# Patient Record
Sex: FEMALE | Race: BLACK OR AFRICAN AMERICAN | NOT HISPANIC OR LATINO | URBAN - METROPOLITAN AREA
[De-identification: names, ages, dates, MRNs, and addresses within clinical notes are randomized per-mention and may not be internally consistent; named-entity substitution may affect disease eponyms.]

---

## 2017-01-31 ENCOUNTER — OUTPATIENT (OUTPATIENT)
Dept: OUTPATIENT SERVICES | Age: 32
LOS: 1 days | Discharge: HOME | End: 2017-01-31

## 2017-02-01 ENCOUNTER — APPOINTMENT (OUTPATIENT)
Dept: PEDIATRIC CARDIOLOGY | Facility: CLINIC | Age: 32
End: 2017-02-01

## 2017-03-03 ENCOUNTER — OUTPATIENT (OUTPATIENT)
Dept: OUTPATIENT SERVICES | Facility: HOSPITAL | Age: 32
LOS: 1 days | End: 2017-03-03
Payer: COMMERCIAL

## 2017-03-03 DIAGNOSIS — O26.899 OTHER SPECIFIED PREGNANCY RELATED CONDITIONS, UNSPECIFIED TRIMESTER: ICD-10-CM

## 2017-03-03 DIAGNOSIS — Z3A.00 WEEKS OF GESTATION OF PREGNANCY NOT SPECIFIED: ICD-10-CM

## 2017-03-03 LAB
APPEARANCE UR: CLEAR — SIGNIFICANT CHANGE UP
BACTERIA # UR AUTO: SIGNIFICANT CHANGE UP
BILIRUB UR-MCNC: NEGATIVE — SIGNIFICANT CHANGE UP
BLOOD UR QL VISUAL: NEGATIVE — SIGNIFICANT CHANGE UP
COLOR SPEC: YELLOW — SIGNIFICANT CHANGE UP
GLUCOSE UR-MCNC: NEGATIVE — SIGNIFICANT CHANGE UP
KETONES UR-MCNC: NEGATIVE — SIGNIFICANT CHANGE UP
LEUKOCYTE ESTERASE UR-ACNC: NEGATIVE — SIGNIFICANT CHANGE UP
MUCOUS THREADS # UR AUTO: SIGNIFICANT CHANGE UP
NITRITE UR-MCNC: NEGATIVE — SIGNIFICANT CHANGE UP
NON-SQ EPI CELLS # UR AUTO: <1 — SIGNIFICANT CHANGE UP
PH UR: 6 — SIGNIFICANT CHANGE UP (ref 4.6–8)
PROT UR-MCNC: NEGATIVE — SIGNIFICANT CHANGE UP
RBC CASTS # UR COMP ASSIST: SIGNIFICANT CHANGE UP (ref 0–?)
SP GR SPEC: 1.01 — SIGNIFICANT CHANGE UP (ref 1–1.03)
SQUAMOUS # UR AUTO: SIGNIFICANT CHANGE UP
UROBILINOGEN FLD QL: NORMAL E.U. — SIGNIFICANT CHANGE UP (ref 0.1–0.2)
WBC UR QL: SIGNIFICANT CHANGE UP (ref 0–?)

## 2017-03-03 PROCEDURE — 93971 EXTREMITY STUDY: CPT | Mod: 26,LT

## 2017-03-21 ENCOUNTER — OUTPATIENT (OUTPATIENT)
Dept: OUTPATIENT SERVICES | Facility: HOSPITAL | Age: 32
LOS: 1 days | End: 2017-03-21
Payer: COMMERCIAL

## 2017-03-21 DIAGNOSIS — Z3A.00 WEEKS OF GESTATION OF PREGNANCY NOT SPECIFIED: ICD-10-CM

## 2017-03-21 DIAGNOSIS — O26.899 OTHER SPECIFIED PREGNANCY RELATED CONDITIONS, UNSPECIFIED TRIMESTER: ICD-10-CM

## 2017-03-21 PROCEDURE — 99215 OFFICE O/P EST HI 40 MIN: CPT | Mod: 25

## 2017-03-21 PROCEDURE — 76818 FETAL BIOPHYS PROFILE W/NST: CPT | Mod: 26

## 2017-03-21 RX ORDER — SODIUM CHLORIDE 9 MG/ML
500 INJECTION, SOLUTION INTRAVENOUS ONCE
Qty: 0 | Refills: 0 | Status: COMPLETED | OUTPATIENT
Start: 2017-03-21 | End: 2017-03-21

## 2017-03-21 RX ORDER — SODIUM CHLORIDE 9 MG/ML
1000 INJECTION, SOLUTION INTRAVENOUS
Qty: 0 | Refills: 0 | Status: DISCONTINUED | OUTPATIENT
Start: 2017-03-21 | End: 2017-04-05

## 2017-03-21 RX ADMIN — SODIUM CHLORIDE 1500 MILLILITER(S): 9 INJECTION, SOLUTION INTRAVENOUS at 16:40

## 2017-03-21 RX ADMIN — SODIUM CHLORIDE 125 MILLILITER(S): 9 INJECTION, SOLUTION INTRAVENOUS at 17:08

## 2017-05-19 ENCOUNTER — OUTPATIENT (OUTPATIENT)
Dept: OUTPATIENT SERVICES | Facility: HOSPITAL | Age: 32
LOS: 1 days | End: 2017-05-19
Payer: COMMERCIAL

## 2017-05-19 VITALS
TEMPERATURE: 98 F | HEIGHT: 62 IN | OXYGEN SATURATION: 99 % | RESPIRATION RATE: 16 BRPM | WEIGHT: 210.1 LBS | HEART RATE: 122 BPM | SYSTOLIC BLOOD PRESSURE: 108 MMHG | DIASTOLIC BLOOD PRESSURE: 76 MMHG

## 2017-05-19 DIAGNOSIS — Z33.1 PREGNANT STATE, INCIDENTAL: ICD-10-CM

## 2017-05-19 DIAGNOSIS — Z91.013 ALLERGY TO SEAFOOD: ICD-10-CM

## 2017-05-19 DIAGNOSIS — O34.219 MATERNAL CARE FOR UNSPECIFIED TYPE SCAR FROM PREVIOUS CESAREAN DELIVERY: ICD-10-CM

## 2017-05-19 LAB
APPEARANCE UR: SIGNIFICANT CHANGE UP
BACTERIA # UR AUTO: SIGNIFICANT CHANGE UP
BILIRUB UR-MCNC: NEGATIVE — SIGNIFICANT CHANGE UP
BLD GP AB SCN SERPL QL: NEGATIVE — SIGNIFICANT CHANGE UP
BLOOD UR QL VISUAL: NEGATIVE — SIGNIFICANT CHANGE UP
BUN SERPL-MCNC: 7 MG/DL — SIGNIFICANT CHANGE UP (ref 7–23)
CALCIUM SERPL-MCNC: 9.1 MG/DL — SIGNIFICANT CHANGE UP (ref 8.4–10.5)
CHLORIDE SERPL-SCNC: 101 MMOL/L — SIGNIFICANT CHANGE UP (ref 98–107)
CO2 SERPL-SCNC: 21 MMOL/L — LOW (ref 22–31)
COLOR SPEC: YELLOW — SIGNIFICANT CHANGE UP
CREAT SERPL-MCNC: 0.69 MG/DL — SIGNIFICANT CHANGE UP (ref 0.5–1.3)
GLUCOSE SERPL-MCNC: 90 MG/DL — SIGNIFICANT CHANGE UP (ref 70–99)
GLUCOSE UR-MCNC: NEGATIVE — SIGNIFICANT CHANGE UP
HBA1C BLD-MCNC: 6.1 % — HIGH (ref 4–5.6)
HCT VFR BLD CALC: 34.1 % — LOW (ref 34.5–45)
HGB BLD-MCNC: 10.6 G/DL — LOW (ref 11.5–15.5)
KETONES UR-MCNC: NEGATIVE — SIGNIFICANT CHANGE UP
LEUKOCYTE ESTERASE UR-ACNC: HIGH
MCHC RBC-ENTMCNC: 24.4 PG — LOW (ref 27–34)
MCHC RBC-ENTMCNC: 31.1 % — LOW (ref 32–36)
MCV RBC AUTO: 78.4 FL — LOW (ref 80–100)
MUCOUS THREADS # UR AUTO: SIGNIFICANT CHANGE UP
NITRITE UR-MCNC: NEGATIVE — SIGNIFICANT CHANGE UP
NON-SQ EPI CELLS # UR AUTO: 1 — SIGNIFICANT CHANGE UP
PH UR: 6.5 — SIGNIFICANT CHANGE UP (ref 4.6–8)
PLATELET # BLD AUTO: 186 K/UL — SIGNIFICANT CHANGE UP (ref 150–400)
PMV BLD: 12.5 FL — SIGNIFICANT CHANGE UP (ref 7–13)
POTASSIUM SERPL-MCNC: 3.9 MMOL/L — SIGNIFICANT CHANGE UP (ref 3.5–5.3)
POTASSIUM SERPL-SCNC: 3.9 MMOL/L — SIGNIFICANT CHANGE UP (ref 3.5–5.3)
PROT UR-MCNC: 10 — SIGNIFICANT CHANGE UP
RBC # BLD: 4.35 M/UL — SIGNIFICANT CHANGE UP (ref 3.8–5.2)
RBC # FLD: 17.2 % — HIGH (ref 10.3–14.5)
RBC CASTS # UR COMP ASSIST: SIGNIFICANT CHANGE UP (ref 0–?)
RH IG SCN BLD-IMP: POSITIVE — SIGNIFICANT CHANGE UP
SODIUM SERPL-SCNC: 138 MMOL/L — SIGNIFICANT CHANGE UP (ref 135–145)
SP GR SPEC: 1.01 — SIGNIFICANT CHANGE UP (ref 1–1.03)
SQUAMOUS # UR AUTO: SIGNIFICANT CHANGE UP
T PALLIDUM AB TITR SER: NEGATIVE — SIGNIFICANT CHANGE UP
UROBILINOGEN FLD QL: NORMAL E.U. — SIGNIFICANT CHANGE UP (ref 0.1–0.2)
WBC # BLD: 7.31 K/UL — SIGNIFICANT CHANGE UP (ref 3.8–10.5)
WBC # FLD AUTO: 7.31 K/UL — SIGNIFICANT CHANGE UP (ref 3.8–10.5)
WBC UR QL: HIGH (ref 0–?)

## 2017-05-19 PROCEDURE — 93010 ELECTROCARDIOGRAM REPORT: CPT

## 2017-05-19 NOTE — H&P PST ADULT - NEGATIVE FEMALE-SPECIFIC SYMPTOMS
no abnormal vaginal bleeding/no spotting/no pelvic pain no abnormal vaginal bleeding/no pelvic pain/denies leakage of amniotic fluid/no spotting

## 2017-05-19 NOTE — H&P PST ADULT - NSANTHOSAYNRD_GEN_A_CORE
No. ELIAS screening performed.  STOP BANG Legend: 0-2 = LOW Risk; 3-4 = INTERMEDIATE Risk; 5-8 = HIGH Risk

## 2017-05-19 NOTE — H&P PST ADULT - PMH
Allergic rhinitis    Anemia    Asthma  denies intubation, has not used inhaler for 2 months  Gestational diabetes mellitus in pregnancy, insulin controlled  pt reports she was diagnosed in her 5th week fo pregnancy, and she may have DM2  Pregnancy

## 2017-05-19 NOTE — H&P PST ADULT - NEGATIVE MUSCULOSKELETAL SYMPTOMS
no arthritis/no joint swelling/no arm pain L/no arm pain R/no stiffness/no myalgia/no muscle cramps/no leg pain L/no neck pain/no muscle weakness/no arthralgia/no back pain

## 2017-05-19 NOTE — H&P PST ADULT - LYMPHATIC
anterior cervical R/posterior cervical R/supraclavicular L/anterior cervical L/supraclavicular R/posterior cervical L

## 2017-05-19 NOTE — H&P PST ADULT - HISTORY OF PRESENT ILLNESS
32 yo female with no pertinent PMH presents to pre surgical testing.  Pt reports she is 37 weeks into gestation, pt is , SAMM 17.  Pt reports she was diagnosed with diabetes in her 5th week of pregnancy.  Pt denies vaginal bleeding, spotting, or leakage of amniotic fluid.  Pt denies regular uterine contractions.  Pt reports positive fetal movement today. pt is scheduled for repeat  section x2 on 17.

## 2017-05-19 NOTE — H&P PST ADULT - RS GEN PE MLT RESP DETAILS PC
airway patent/breath sounds equal/no subcutaneous emphysema/clear to auscultation bilaterally/no intercostal retractions/respirations non-labored/no chest wall tenderness/no rales/no wheezes/no rhonchi/good air movement

## 2017-05-19 NOTE — H&P PST ADULT - FAMILY HISTORY
Father  Still living? Unknown  Family history of diabetes mellitus, Age at diagnosis: Age Unknown  Family history of hypertension, Age at diagnosis: Age Unknown     Mother  Still living? Yes, Estimated age: Age Unknown  Family history of diabetes mellitus, Age at diagnosis: Age Unknown  Family history of hypertension, Age at diagnosis: Age Unknown

## 2017-05-19 NOTE — H&P PST ADULT - PROBLEM SELECTOR PLAN 1
pt is scheduled for repeat  section x2 on 17.  Pre op instructions including chlorhexidine wash given and pt able to verbalize understanding.   Pt instructed to consult OB regarding all prescription medications pre op

## 2017-05-26 ENCOUNTER — INPATIENT (INPATIENT)
Facility: HOSPITAL | Age: 32
LOS: 2 days | Discharge: HOME | End: 2017-05-29
Attending: OBSTETRICS & GYNECOLOGY | Admitting: OBSTETRICS & GYNECOLOGY
Payer: COMMERCIAL

## 2017-05-26 ENCOUNTER — TRANSCRIPTION ENCOUNTER (OUTPATIENT)
Age: 32
End: 2017-05-26

## 2017-05-26 VITALS — HEIGHT: 62 IN | WEIGHT: 209.44 LBS

## 2017-05-26 DIAGNOSIS — Z3A.00 WEEKS OF GESTATION OF PREGNANCY NOT SPECIFIED: ICD-10-CM

## 2017-05-26 DIAGNOSIS — O26.899 OTHER SPECIFIED PREGNANCY RELATED CONDITIONS, UNSPECIFIED TRIMESTER: ICD-10-CM

## 2017-05-26 LAB
BASOPHILS # BLD AUTO: 0.02 K/UL — SIGNIFICANT CHANGE UP (ref 0–0.2)
BASOPHILS NFR BLD AUTO: 0.2 % — SIGNIFICANT CHANGE UP (ref 0–2)
BLD GP AB SCN SERPL QL: NEGATIVE — SIGNIFICANT CHANGE UP
EOSINOPHIL # BLD AUTO: 0.23 K/UL — SIGNIFICANT CHANGE UP (ref 0–0.5)
EOSINOPHIL NFR BLD AUTO: 2.7 % — SIGNIFICANT CHANGE UP (ref 0–6)
HCT VFR BLD CALC: 35.2 % — SIGNIFICANT CHANGE UP (ref 34.5–45)
HGB BLD-MCNC: 11 G/DL — LOW (ref 11.5–15.5)
IMM GRANULOCYTES NFR BLD AUTO: 0.2 % — SIGNIFICANT CHANGE UP (ref 0–1.5)
LYMPHOCYTES # BLD AUTO: 1.59 K/UL — SIGNIFICANT CHANGE UP (ref 1–3.3)
LYMPHOCYTES # BLD AUTO: 18.8 % — SIGNIFICANT CHANGE UP (ref 13–44)
MCHC RBC-ENTMCNC: 24.4 PG — LOW (ref 27–34)
MCHC RBC-ENTMCNC: 31.3 % — LOW (ref 32–36)
MCV RBC AUTO: 78 FL — LOW (ref 80–100)
MONOCYTES # BLD AUTO: 0.52 K/UL — SIGNIFICANT CHANGE UP (ref 0–0.9)
MONOCYTES NFR BLD AUTO: 6.2 % — SIGNIFICANT CHANGE UP (ref 2–14)
NEUTROPHILS # BLD AUTO: 6.07 K/UL — SIGNIFICANT CHANGE UP (ref 1.8–7.4)
NEUTROPHILS NFR BLD AUTO: 71.9 % — SIGNIFICANT CHANGE UP (ref 43–77)
PLATELET # BLD AUTO: 152 K/UL — SIGNIFICANT CHANGE UP (ref 150–400)
PMV BLD: SIGNIFICANT CHANGE UP FL (ref 7–13)
RBC # BLD: 4.51 M/UL — SIGNIFICANT CHANGE UP (ref 3.8–5.2)
RBC # FLD: 17 % — HIGH (ref 10.3–14.5)
RH IG SCN BLD-IMP: POSITIVE — SIGNIFICANT CHANGE UP
WBC # BLD: 8.45 K/UL — SIGNIFICANT CHANGE UP (ref 3.8–10.5)
WBC # FLD AUTO: 8.45 K/UL — SIGNIFICANT CHANGE UP (ref 3.8–10.5)

## 2017-05-26 RX ORDER — HEPARIN SODIUM 5000 [USP'U]/ML
5000 INJECTION INTRAVENOUS; SUBCUTANEOUS EVERY 12 HOURS
Qty: 0 | Refills: 0 | Status: DISCONTINUED | OUTPATIENT
Start: 2017-05-27 | End: 2017-05-29

## 2017-05-26 RX ORDER — SIMETHICONE 80 MG/1
80 TABLET, CHEWABLE ORAL EVERY 4 HOURS
Qty: 0 | Refills: 0 | Status: DISCONTINUED | OUTPATIENT
Start: 2017-05-27 | End: 2017-05-29

## 2017-05-26 RX ORDER — FAMOTIDINE 10 MG/ML
20 INJECTION INTRAVENOUS ONCE
Qty: 0 | Refills: 0 | Status: COMPLETED | OUTPATIENT
Start: 2017-05-26 | End: 2017-05-26

## 2017-05-26 RX ORDER — ACETAMINOPHEN 500 MG
975 TABLET ORAL EVERY 6 HOURS
Qty: 0 | Refills: 0 | Status: DISCONTINUED | OUTPATIENT
Start: 2017-05-27 | End: 2017-05-29

## 2017-05-26 RX ORDER — MORPHINE SULFATE 50 MG/1
0.15 CAPSULE, EXTENDED RELEASE ORAL ONCE
Qty: 0 | Refills: 0 | Status: DISCONTINUED | OUTPATIENT
Start: 2017-05-26 | End: 2017-05-28

## 2017-05-26 RX ORDER — INSULIN LISPRO 100/ML
14 VIAL (ML) SUBCUTANEOUS
Qty: 0 | Refills: 0 | COMMUNITY

## 2017-05-26 RX ORDER — CITRIC ACID/SODIUM CITRATE 300-500 MG
30 SOLUTION, ORAL ORAL ONCE
Qty: 0 | Refills: 0 | Status: COMPLETED | OUTPATIENT
Start: 2017-05-26 | End: 2017-05-26

## 2017-05-26 RX ORDER — ONDANSETRON 8 MG/1
4 TABLET, FILM COATED ORAL EVERY 6 HOURS
Qty: 0 | Refills: 0 | Status: DISCONTINUED | OUTPATIENT
Start: 2017-05-26 | End: 2017-05-28

## 2017-05-26 RX ORDER — GLYCERIN ADULT
1 SUPPOSITORY, RECTAL RECTAL AT BEDTIME
Qty: 0 | Refills: 0 | Status: DISCONTINUED | OUTPATIENT
Start: 2017-05-27 | End: 2017-05-29

## 2017-05-26 RX ORDER — LANOLIN
1 OINTMENT (GRAM) TOPICAL
Qty: 0 | Refills: 0 | Status: DISCONTINUED | OUTPATIENT
Start: 2017-05-27 | End: 2017-05-29

## 2017-05-26 RX ORDER — TETANUS TOXOID, REDUCED DIPHTHERIA TOXOID AND ACELLULAR PERTUSSIS VACCINE, ADSORBED 5; 2.5; 8; 8; 2.5 [IU]/.5ML; [IU]/.5ML; UG/.5ML; UG/.5ML; UG/.5ML
0.5 SUSPENSION INTRAMUSCULAR ONCE
Qty: 0 | Refills: 0 | Status: DISCONTINUED | OUTPATIENT
Start: 2017-05-27 | End: 2017-05-29

## 2017-05-26 RX ORDER — METOCLOPRAMIDE HCL 10 MG
10 TABLET ORAL ONCE
Qty: 0 | Refills: 0 | Status: COMPLETED | OUTPATIENT
Start: 2017-05-26 | End: 2017-05-26

## 2017-05-26 RX ORDER — SODIUM CHLORIDE 9 MG/ML
1000 INJECTION, SOLUTION INTRAVENOUS
Qty: 0 | Refills: 0 | Status: DISCONTINUED | OUTPATIENT
Start: 2017-05-26 | End: 2017-05-26

## 2017-05-26 RX ORDER — INSULIN LISPRO 100/ML
12 VIAL (ML) SUBCUTANEOUS
Qty: 0 | Refills: 0 | COMMUNITY

## 2017-05-26 RX ORDER — OXYTOCIN 10 UNIT/ML
83.33 VIAL (ML) INJECTION
Qty: 20 | Refills: 0 | Status: DISCONTINUED | OUTPATIENT
Start: 2017-05-26 | End: 2017-05-27

## 2017-05-26 RX ORDER — DIPHENHYDRAMINE HCL 50 MG
25 CAPSULE ORAL EVERY 4 HOURS
Qty: 0 | Refills: 0 | Status: DISCONTINUED | OUTPATIENT
Start: 2017-05-26 | End: 2017-05-28

## 2017-05-26 RX ORDER — IBUPROFEN 200 MG
600 TABLET ORAL EVERY 6 HOURS
Qty: 0 | Refills: 0 | Status: DISCONTINUED | OUTPATIENT
Start: 2017-05-27 | End: 2017-05-29

## 2017-05-26 RX ORDER — NALOXONE HYDROCHLORIDE 4 MG/.1ML
0.1 SPRAY NASAL
Qty: 0 | Refills: 0 | Status: DISCONTINUED | OUTPATIENT
Start: 2017-05-26 | End: 2017-05-28

## 2017-05-26 RX ORDER — FERROUS SULFATE 325(65) MG
325 TABLET ORAL DAILY
Qty: 0 | Refills: 0 | Status: DISCONTINUED | OUTPATIENT
Start: 2017-05-27 | End: 2017-05-29

## 2017-05-26 RX ORDER — DIPHENHYDRAMINE HCL 50 MG
25 CAPSULE ORAL EVERY 6 HOURS
Qty: 0 | Refills: 0 | Status: DISCONTINUED | OUTPATIENT
Start: 2017-05-27 | End: 2017-05-29

## 2017-05-26 RX ORDER — SODIUM CHLORIDE 9 MG/ML
1000 INJECTION, SOLUTION INTRAVENOUS ONCE
Qty: 0 | Refills: 0 | Status: COMPLETED | OUTPATIENT
Start: 2017-05-26 | End: 2017-05-26

## 2017-05-26 RX ORDER — HYDROMORPHONE HYDROCHLORIDE 2 MG/ML
1 INJECTION INTRAMUSCULAR; INTRAVENOUS; SUBCUTANEOUS
Qty: 0 | Refills: 0 | Status: DISCONTINUED | OUTPATIENT
Start: 2017-05-26 | End: 2017-05-27

## 2017-05-26 RX ORDER — OXYCODONE HYDROCHLORIDE 5 MG/1
5 TABLET ORAL
Qty: 0 | Refills: 0 | Status: DISCONTINUED | OUTPATIENT
Start: 2017-05-26 | End: 2017-05-28

## 2017-05-26 RX ORDER — OXYCODONE HYDROCHLORIDE 5 MG/1
10 TABLET ORAL
Qty: 0 | Refills: 0 | Status: DISCONTINUED | OUTPATIENT
Start: 2017-05-26 | End: 2017-05-28

## 2017-05-26 RX ADMIN — Medication 125 MILLIUNIT(S)/MIN: at 23:54

## 2017-05-26 RX ADMIN — Medication 30 MILLILITER(S): at 20:27

## 2017-05-26 RX ADMIN — SODIUM CHLORIDE 2000 MILLILITER(S): 9 INJECTION, SOLUTION INTRAVENOUS at 17:43

## 2017-05-26 RX ADMIN — FAMOTIDINE 20 MILLIGRAM(S): 10 INJECTION INTRAVENOUS at 19:05

## 2017-05-26 RX ADMIN — Medication 10 MILLIGRAM(S): at 19:05

## 2017-05-27 ENCOUNTER — TRANSCRIPTION ENCOUNTER (OUTPATIENT)
Age: 32
End: 2017-05-27

## 2017-05-27 DIAGNOSIS — E11.9 TYPE 2 DIABETES MELLITUS WITHOUT COMPLICATIONS: ICD-10-CM

## 2017-05-27 LAB
APTT BLD: 28.3 SEC — SIGNIFICANT CHANGE UP (ref 27.5–37.4)
BUN SERPL-MCNC: 6 MG/DL — LOW (ref 7–23)
BUN SERPL-MCNC: 8 MG/DL — SIGNIFICANT CHANGE UP (ref 7–23)
CALCIUM SERPL-MCNC: 8.4 MG/DL — SIGNIFICANT CHANGE UP (ref 8.4–10.5)
CALCIUM SERPL-MCNC: 8.5 MG/DL — SIGNIFICANT CHANGE UP (ref 8.4–10.5)
CHLORIDE SERPL-SCNC: 101 MMOL/L — SIGNIFICANT CHANGE UP (ref 98–107)
CHLORIDE SERPL-SCNC: 104 MMOL/L — SIGNIFICANT CHANGE UP (ref 98–107)
CO2 SERPL-SCNC: 19 MMOL/L — LOW (ref 22–31)
CO2 SERPL-SCNC: 21 MMOL/L — LOW (ref 22–31)
CREAT SERPL-MCNC: 0.5 MG/DL — SIGNIFICANT CHANGE UP (ref 0.5–1.3)
CREAT SERPL-MCNC: 0.53 MG/DL — SIGNIFICANT CHANGE UP (ref 0.5–1.3)
FIBRINOGEN PPP-MCNC: 496.5 MG/DL — SIGNIFICANT CHANGE UP (ref 310–510)
GLUCOSE SERPL-MCNC: 106 MG/DL — HIGH (ref 70–99)
GLUCOSE SERPL-MCNC: 121 MG/DL — HIGH (ref 70–99)
HBV SURFACE AG SER-ACNC: NEGATIVE — SIGNIFICANT CHANGE UP
HCT VFR BLD CALC: 30.5 % — LOW (ref 34.5–45)
HCT VFR BLD CALC: 31.5 % — LOW (ref 34.5–45)
HGB BLD-MCNC: 10 G/DL — LOW (ref 11.5–15.5)
HGB BLD-MCNC: 9.3 G/DL — LOW (ref 11.5–15.5)
INR BLD: 0.95 — SIGNIFICANT CHANGE UP (ref 0.88–1.17)
MCHC RBC-ENTMCNC: 23.8 PG — LOW (ref 27–34)
MCHC RBC-ENTMCNC: 24.8 PG — LOW (ref 27–34)
MCHC RBC-ENTMCNC: 30.5 % — LOW (ref 32–36)
MCHC RBC-ENTMCNC: 31.7 % — LOW (ref 32–36)
MCV RBC AUTO: 78 FL — LOW (ref 80–100)
MCV RBC AUTO: 78.2 FL — LOW (ref 80–100)
PLATELET # BLD AUTO: 127 K/UL — LOW (ref 150–400)
PLATELET # BLD AUTO: 138 K/UL — LOW (ref 150–400)
PMV BLD: SIGNIFICANT CHANGE UP FL (ref 7–13)
PMV BLD: SIGNIFICANT CHANGE UP FL (ref 7–13)
POTASSIUM SERPL-MCNC: 3.9 MMOL/L — SIGNIFICANT CHANGE UP (ref 3.5–5.3)
POTASSIUM SERPL-MCNC: 3.9 MMOL/L — SIGNIFICANT CHANGE UP (ref 3.5–5.3)
POTASSIUM SERPL-SCNC: 3.9 MMOL/L — SIGNIFICANT CHANGE UP (ref 3.5–5.3)
POTASSIUM SERPL-SCNC: 3.9 MMOL/L — SIGNIFICANT CHANGE UP (ref 3.5–5.3)
PROTHROM AB SERPL-ACNC: 10.6 SEC — SIGNIFICANT CHANGE UP (ref 9.8–13.1)
RBC # BLD: 3.9 M/UL — SIGNIFICANT CHANGE UP (ref 3.8–5.2)
RBC # BLD: 4.04 M/UL — SIGNIFICANT CHANGE UP (ref 3.8–5.2)
RBC # FLD: 16.9 % — HIGH (ref 10.3–14.5)
RBC # FLD: 17 % — HIGH (ref 10.3–14.5)
SODIUM SERPL-SCNC: 135 MMOL/L — SIGNIFICANT CHANGE UP (ref 135–145)
SODIUM SERPL-SCNC: 136 MMOL/L — SIGNIFICANT CHANGE UP (ref 135–145)
WBC # BLD: 11.06 K/UL — HIGH (ref 3.8–10.5)
WBC # BLD: 12.98 K/UL — HIGH (ref 3.8–10.5)
WBC # FLD AUTO: 11.06 K/UL — HIGH (ref 3.8–10.5)
WBC # FLD AUTO: 12.98 K/UL — HIGH (ref 3.8–10.5)

## 2017-05-27 PROCEDURE — 99254 IP/OBS CNSLTJ NEW/EST MOD 60: CPT

## 2017-05-27 RX ORDER — KETOROLAC TROMETHAMINE 30 MG/ML
30 SYRINGE (ML) INJECTION EVERY 6 HOURS
Qty: 0 | Refills: 0 | Status: DISCONTINUED | OUTPATIENT
Start: 2017-05-27 | End: 2017-05-27

## 2017-05-27 RX ORDER — SODIUM CHLORIDE 9 MG/ML
1000 INJECTION, SOLUTION INTRAVENOUS ONCE
Qty: 0 | Refills: 0 | Status: COMPLETED | OUTPATIENT
Start: 2017-05-27 | End: 2017-05-27

## 2017-05-27 RX ORDER — SODIUM CHLORIDE 9 MG/ML
1000 INJECTION, SOLUTION INTRAVENOUS
Qty: 0 | Refills: 0 | Status: DISCONTINUED | OUTPATIENT
Start: 2017-05-27 | End: 2017-05-28

## 2017-05-27 RX ORDER — DOCUSATE SODIUM 100 MG
100 CAPSULE ORAL
Qty: 0 | Refills: 0 | Status: DISCONTINUED | OUTPATIENT
Start: 2017-05-27 | End: 2017-05-27

## 2017-05-27 RX ORDER — DEXTROSE 50 % IN WATER 50 %
1 SYRINGE (ML) INTRAVENOUS ONCE
Qty: 0 | Refills: 0 | Status: DISCONTINUED | OUTPATIENT
Start: 2017-05-27 | End: 2017-05-29

## 2017-05-27 RX ORDER — DEXTROSE 50 % IN WATER 50 %
25 SYRINGE (ML) INTRAVENOUS ONCE
Qty: 0 | Refills: 0 | Status: DISCONTINUED | OUTPATIENT
Start: 2017-05-27 | End: 2017-05-29

## 2017-05-27 RX ORDER — SODIUM CHLORIDE 9 MG/ML
1000 INJECTION, SOLUTION INTRAVENOUS
Qty: 0 | Refills: 0 | Status: DISCONTINUED | OUTPATIENT
Start: 2017-05-27 | End: 2017-05-29

## 2017-05-27 RX ORDER — OXYTOCIN 10 UNIT/ML
41.67 VIAL (ML) INJECTION
Qty: 20 | Refills: 0 | Status: DISCONTINUED | OUTPATIENT
Start: 2017-05-27 | End: 2017-05-27

## 2017-05-27 RX ORDER — INSULIN LISPRO 100/ML
VIAL (ML) SUBCUTANEOUS
Qty: 0 | Refills: 0 | Status: DISCONTINUED | OUTPATIENT
Start: 2017-05-27 | End: 2017-05-29

## 2017-05-27 RX ORDER — OXYTOCIN 10 UNIT/ML
333.33 VIAL (ML) INJECTION
Qty: 20 | Refills: 0 | Status: DISCONTINUED | OUTPATIENT
Start: 2017-05-27 | End: 2017-05-27

## 2017-05-27 RX ORDER — ALBUMIN HUMAN 25 %
100 VIAL (ML) INTRAVENOUS ONCE
Qty: 0 | Refills: 0 | Status: COMPLETED | OUTPATIENT
Start: 2017-05-27 | End: 2017-05-27

## 2017-05-27 RX ORDER — ALBUTEROL 90 UG/1
2 AEROSOL, METERED ORAL EVERY 6 HOURS
Qty: 0 | Refills: 0 | Status: DISCONTINUED | OUTPATIENT
Start: 2017-05-27 | End: 2017-05-29

## 2017-05-27 RX ORDER — SODIUM CHLORIDE 9 MG/ML
1000 INJECTION, SOLUTION INTRAVENOUS
Qty: 0 | Refills: 0 | Status: DISCONTINUED | OUTPATIENT
Start: 2017-05-27 | End: 2017-05-27

## 2017-05-27 RX ORDER — GLUCAGON INJECTION, SOLUTION 0.5 MG/.1ML
1 INJECTION, SOLUTION SUBCUTANEOUS ONCE
Qty: 0 | Refills: 0 | Status: DISCONTINUED | OUTPATIENT
Start: 2017-05-27 | End: 2017-05-29

## 2017-05-27 RX ORDER — DEXTROSE 50 % IN WATER 50 %
12.5 SYRINGE (ML) INTRAVENOUS ONCE
Qty: 0 | Refills: 0 | Status: DISCONTINUED | OUTPATIENT
Start: 2017-05-27 | End: 2017-05-29

## 2017-05-27 RX ORDER — DOCUSATE SODIUM 100 MG
100 CAPSULE ORAL THREE TIMES A DAY
Qty: 0 | Refills: 0 | Status: DISCONTINUED | OUTPATIENT
Start: 2017-05-27 | End: 2017-05-29

## 2017-05-27 RX ADMIN — SODIUM CHLORIDE 2000 MILLILITER(S): 9 INJECTION, SOLUTION INTRAVENOUS at 06:15

## 2017-05-27 RX ADMIN — Medication 975 MILLIGRAM(S): at 16:30

## 2017-05-27 RX ADMIN — SODIUM CHLORIDE 2000 MILLILITER(S): 9 INJECTION, SOLUTION INTRAVENOUS at 03:30

## 2017-05-27 RX ADMIN — Medication 975 MILLIGRAM(S): at 21:56

## 2017-05-27 RX ADMIN — HYDROMORPHONE HYDROCHLORIDE 1 MILLIGRAM(S): 2 INJECTION INTRAMUSCULAR; INTRAVENOUS; SUBCUTANEOUS at 01:00

## 2017-05-27 RX ADMIN — Medication 975 MILLIGRAM(S): at 17:15

## 2017-05-27 RX ADMIN — HEPARIN SODIUM 5000 UNIT(S): 5000 INJECTION INTRAVENOUS; SUBCUTANEOUS at 07:33

## 2017-05-27 RX ADMIN — Medication 600 MILLIGRAM(S): at 22:45

## 2017-05-27 RX ADMIN — SIMETHICONE 80 MILLIGRAM(S): 80 TABLET, CHEWABLE ORAL at 09:45

## 2017-05-27 RX ADMIN — HYDROMORPHONE HYDROCHLORIDE 1 MILLIGRAM(S): 2 INJECTION INTRAMUSCULAR; INTRAVENOUS; SUBCUTANEOUS at 00:17

## 2017-05-27 RX ADMIN — SIMETHICONE 80 MILLIGRAM(S): 80 TABLET, CHEWABLE ORAL at 15:02

## 2017-05-27 RX ADMIN — Medication 975 MILLIGRAM(S): at 22:45

## 2017-05-27 RX ADMIN — HYDROMORPHONE HYDROCHLORIDE 1 MILLIGRAM(S): 2 INJECTION INTRAMUSCULAR; INTRAVENOUS; SUBCUTANEOUS at 07:33

## 2017-05-27 RX ADMIN — HEPARIN SODIUM 5000 UNIT(S): 5000 INJECTION INTRAVENOUS; SUBCUTANEOUS at 21:56

## 2017-05-27 RX ADMIN — Medication 600 MILLIGRAM(S): at 21:56

## 2017-05-27 RX ADMIN — HYDROMORPHONE HYDROCHLORIDE 1 MILLIGRAM(S): 2 INJECTION INTRAMUSCULAR; INTRAVENOUS; SUBCUTANEOUS at 08:03

## 2017-05-27 RX ADMIN — SIMETHICONE 80 MILLIGRAM(S): 80 TABLET, CHEWABLE ORAL at 21:56

## 2017-05-27 RX ADMIN — Medication 100 MILLIGRAM(S): at 21:56

## 2017-05-27 NOTE — CONSULT NOTE ADULT - SUBJECTIVE AND OBJECTIVE BOX
HPI: 31 y.o. female with h/o Type 2 DM, anemia and asthma presently 1 day post . Patient reports having GDM with her 2 previous pregnancies. During this pregnancy, patient reports being told she actually has Type 2 DM because of increased blood glucose levels during the first trimester. Before delivery, patient was on basal bolus regimen and taking Levemir 52 units daily and Novolog 12 units before breakfast and lunch and 14 units before dinner. Last Hba1c was 6.1%. Patient c/o abdominal distention and pain. No nausea and no vomiting. Tolerating p.o. intake. No polyuria and no polydipsia. No SOB or CP. No vision changes. No foot complaints. Not aware of diabetes related complications.        PAST MEDICAL & SURGICAL HISTORY:  Pregnancy  Asthma  Anemia  Anemia  Gestational diabetes mellitus in pregnancy, insulin controlled: pt reports she was diagnosed in her 5th week of pregnancy, and she may have DM2  Allergic rhinitis  38 weeks gestation of pregnancy  Asthma: denies intubation, has not used inhaler for 2 months    H/O: : ,       FAMILY HISTORY:  Family history of hypertension  Family history of diabetes mellitus Mother and Father        Social History: No smoking and no alcohol    Outpatient Medications:    Levemir 52 units daily  Novolog 14 QAC  PNV and iron    MEDICATIONS  (STANDING):  morphine PF Spinal 0.15milliGRAM(s) IntraThecal. once  lactated ringers. 1000milliLiter(s) IV Continuous <Continuous>  diphtheria/tetanus/pertussis (acellular) Vaccine (ADAcel) 0.5milliLiter(s) IntraMuscular once  ferrous    sulfate 325milliGRAM(s) Oral daily  prenatal multivitamin 1Tablet(s) Oral daily  heparin  Injectable 5000Unit(s) SubCutaneous every 12 hours  acetaminophen   Tablet. 975milliGRAM(s) Oral every 6 hours  ibuprofen  Tablet 600milliGRAM(s) Oral every 6 hours  lactated ringers. 1000milliLiter(s) IV Continuous <Continuous>  docusate sodium 100milliGRAM(s) Oral three times a day    MEDICATIONS  (PRN):  oxyCODONE IR 5milliGRAM(s) Oral every 3 hours PRN Mild Pain  oxyCODONE IR 10milliGRAM(s) Oral every 3 hours PRN Moderate Pain  HYDROmorphone  Injectable 1milliGRAM(s) IV Push every 3 hours PRN Severe Pain  naloxone Injectable 0.1milliGRAM(s) IV Push every 3 minutes PRN For ANY of the following changes in patient status:  A. RR LESS THAN 10 breaths per minute, B. Oxygen saturation LESS THAN 90%, C. Sedation score of 6  ondansetron Injectable 4milliGRAM(s) IV Push every 6 hours PRN Nausea  diphenhydrAMINE   Injectable 25milliGRAM(s) IV Push every 4 hours PRN Pruritus  simethicone 80milliGRAM(s) Chew every 4 hours PRN Gas  diphenhydrAMINE   Capsule 25milliGRAM(s) Oral every 6 hours PRN Itching  glycerin Suppository - Adult 1Suppository(s) Rectal at bedtime PRN Constipation  lanolin Ointment 1Application(s) Topical every 3 hours PRN Sore Nipples  ALBUTerol    90 MICROgram(s) HFA Inhaler 2Puff(s) Inhalation every 6 hours PRN Bronchospasm      Allergies    No Known Drug Allergies  shellfish (Hives)    Intolerances      Review of Systems:  Constitutional: No fever  Eyes: No blurry vision  Neuro: No tremors  HEENT: No pain  Cardiovascular: No chest pain, palpitations  Respiratory: No SOB, no cough  GI: No nausea, vomiting, abdominal pain  : No dysuria  Skin: no rash  Psych: no depression  Endocrine: no polyuria, polydipsia  Hem/lymph: no swelling  Osteoporosis: no fractures    ALL OTHER SYSTEMS REVIEWED AND NEGATIVE    UNABLE TO OBTAIN    PHYSICAL EXAM:  VITALS: T(C): 36.7  T(F): 98, Max: 98.9 (05-27 @ 10:00)  HR: 88 (65 - 109)  BP: 95/55 (83/47 - 120/67)  RR:  (16 - 27)  SpO2:  (95% - 100%)  Wt(kg): --  GENERAL: NAD, well-groomed, well-developed  EYES: No proptosis, no lid lag, anicteric  HEENT:  Atraumatic, Normocephalic, moist mucous membranes  THYROID: Normal size, no palpable nodules  RESPIRATORY: Clear to auscultation bilaterally; No rales, rhonchi, wheezing  CARDIOVASCULAR: Regular rate and rhythm; No murmurs; no peripheral edema  GI: Soft, nontender, distended, normal bowel sounds  SKIN: Dry, intact, No rashes or lesions  MUSCULOSKELETAL: Full range of motion, normal strength  NEURO: sensation intact, extraocular movements intact,  PSYCH: Alert and oriented x 3, normal affect, normal mood      CAPILLARY BLOOD GLUCOSE  113 ( @ 09:30)                            9.3    11.06 )-----------( 138      ( 27 May 2017 07:00 )             30.5           135  |  101  |  6<L>  ----------------------------<  121<H>  3.9   |  21<L>  |  0.53    EGFR if : 147  EGFR if non : 127    Ca    8.5              Thyroid Function Tests:      Hemoglobin A1C, Whole Blood: 6.1 % <H> [4.0 - 5.6] ( @ 09:50)          Radiology:

## 2017-05-27 NOTE — CHART NOTE - NSCHARTNOTEFT_GEN_A_CORE
Patient is 31y  old.    Event : Pt with painful abdominal distension    Vital Signs Last 24 Hrs  T(C): 36.8, Max: 37 (05-26 @ 23:45)  T(F): 98.2, Max: 98.6 (05-26 @ 23:45)  HR: 77 (65 - 109)  BP: 100/59 (89/69 - 120/67)  BP(mean): 69 (58 - 81)  RR: 20 (17 - 27)  SpO2: 95% (95% - 100%)    I&O's Detail  I & Os for 24h ending 27 May 2017 07:00  =============================================  IN:    Lactated Ringers IV Bolus: 4000 ml    Other: 2000 ml    oxytocin Infusion: 875 ml    oxytocin Infusion: 125 ml    Total IN: 7000 ml  ---------------------------------------------  OUT:    Estimated Blood Loss: 700 ml    Indwelling Catheter - Urethral: 650 ml    Total OUT: 1350 ml  ---------------------------------------------  Total NET: 5650 ml    I & Os for current day (as of 27 May 2017 09:04)  =============================================  IN:    lactated ringers.: 250 ml    lactated ringers.: 125 ml    Total IN: 375 ml  ---------------------------------------------  OUT:    Indwelling Catheter - Urethral: 160 ml    Total OUT: 160 ml  ---------------------------------------------  Total NET: 215 ml      Labs:                        9.3    11.06 )-----------( 138      ( 27 May 2017 07:00 )             30.5                         10.0   12.98 )-----------( 127      ( 27 May 2017 03:20 )             31.5                         11.0   8.45  )-----------( 152      ( 26 May 2017 17:00 )             35.2         PT/INR - ( 27 May 2017 07:20 )   PT: 10.6 SEC;   INR: 0.95          PTT - ( 27 May 2017 07:20 )  PTT:28.3 SEC    Fibrinogen: Fibrinogen Assay: 496.5 mg/dL (05-27 @ 07:20)      Lactate:     MEDICATIONS  (STANDING):  morphine PF Spinal 0.15milliGRAM(s) IntraThecal. once  oxytocin Infusion 83.333milliUNIT(s)/Min IV Continuous <Continuous>  heparin  Injectable 5000Unit(s) SubCutaneous every 12 hours  albumin human 25% IVPB 100milliLiter(s) IV Intermittent once  lactated ringers. 1000milliLiter(s) IV Continuous <Continuous>      Evaluation : VSS, ABD hypoactive BS, very tender to palpation, unable to palpate uterus due to discomfort, very distended but soft, lochia mild, UOP adequate, albumin not given yet, IVF at 250 cc/hr, FAST scan reportedly negative but I don't see a written documentation, H/H stable    Differential Dg : Ileus, gaseous distention, Post op bleeding    Management and Follow-up plan : discussed with Dr. Chinchilla, continue to observe, pain meds, do not give albumin as UOP adequate now, continue IVF at 250 cc, simethicone for gas, lateral positioning and OOB when able, repeat FAST scan?              -------------------------------------------------------------------------------------------------------------

## 2017-05-27 NOTE — DISCHARGE NOTE OB - MEDICATION SUMMARY - MEDICATIONS TO STOP TAKING
I will STOP taking the medications listed below when I get home from the hospital:    Levemir 100 units/mL subcutaneous solution  -- 52 unit(s) subcutaneous once a day (at bedtime)    HumaLOG 100 units/mL subcutaneous solution  -- 12 unit(s) subcutaneous 2 times a day- before breakfast and dinner    HumaLOG 100 units/mL subcutaneous solution  -- 14 unit(s) subcutaneous once a day before lunch

## 2017-05-27 NOTE — DISCHARGE NOTE OB - CARE PROVIDER_API CALL
Meagan Butler (MD), Obstetrics and Gynecology  [address] [place] [address]  Phone: [phone]  Fax: [phone]

## 2017-05-27 NOTE — DISCHARGE NOTE OB - SECONDARY DIAGNOSIS.
H/O:  Insulin controlled gestational diabetes mellitus (GDM) during pregnancy, antepartum Mild intermittent asthma without complication Pelvic adhesions

## 2017-05-27 NOTE — DISCHARGE NOTE OB - PATIENT PORTAL LINK FT
“You can access the [employer] Patient Portal, offered by [facility], by registering with the following website: [email or web address]”

## 2017-05-27 NOTE — PROGRESS NOTE ADULT - SUBJECTIVE AND OBJECTIVE BOX
Postop Day  __1_ s/p   C- Section    THERAPY:  [ x ] Spinal morphine   [  ] Epidural morphine   [  ] IV PCA Hydromorphone 1 mg/ml      Sedation Score:	  [x  ] Alert	    [  ] Drowsy        [  ] Arousable	[  ] Asleep	[  ] Unresponsive    Side Effects:	  [ x ] None	     [  ] Nausea        [  ] Pruritus        [  ] Weakness   [  ] Numbness        ASSESSMENT/ PLAN   [   ] Discontinue         [  ] Continue  [ x ]Documentation and Verification of current medications     Comments:       Patient is doing well.  OOBAA. Tolerating clears.  Pain is tolerable.  No residual anesthetic issues or complications noted.

## 2017-05-27 NOTE — CHART NOTE - NSCHARTNOTEFT_GEN_A_CORE
Jade evaluated due to  report of low UOP x 1 hour.  Adequate for patient is 45 cc.  Last hour patient made 10 cc.  Previous 2 hours patient made adequate UOP.  Patinet denies any HA, lightheadness, dizzines.  Denies CP/SOB.  Denies abodminal pain   HR 69  BP 91/54  O2 sat 99% RA   General: lying comftorably in bed, NAD   CV: RR s1s2  Lungs: CTA b/l, good air flow b/l   Abd: fundus firm, appropriatley tender.  incision w/ bandage in place, c/d/i  Ext: SCD's in place and functional  Vaginal: moderate bleeding on pad   A/P POD#1 with low UOP x 1 hour.  Vitals stable.  asymptomatic.  differential includes intra-abdominal bleeding vs. intravascular depletion.  low concern for intra-abdpminal bleeding given benign exam nad stable vitals.  will give 1 L bolus now and continue to roxanna Richardson PGY-1

## 2017-05-27 NOTE — LACTATION INITIAL EVALUATION - INTERVENTION OUTCOME
Worked with mother to try to latch  and  not 24 hours till 2122pm, and  did a few sucks in football position and trying to latch , and educated on feeding cues and to breastfeed 8-12 times in 24 hours and discussed breastfeeding log and assist prn, RN aware of plan/demonstrates understanding of teaching/good return demonstration/verbalizes understanding

## 2017-05-27 NOTE — CONSULT NOTE ADULT - PROBLEM SELECTOR RECOMMENDATION 9
Discussed pathophysiology of Type 2 DM. Since postpartum, agree with discontinuing basal bolus regimen. For now, will monitor blood glucose QAC and QHS. Will start Humalog low dose correction scale if needed. Encouraged carbohydrate consistent diet. If blood glucose levels remain 100 to 180, will discharge without medication and stress lifestyle changes. If blood glucose levels become elevated, will consider discharge on Metformin. Will follow.

## 2017-05-27 NOTE — PROGRESS NOTE ADULT - SUBJECTIVE AND OBJECTIVE BOX
Patient seen and evaluated for abdominal distention at 945am.    S:  Patient reports pain overall well controlled.  Reports abdomen feels distended.  Denies any pain at rest.   Patient has not passed flatus.  Denies any N/V, dizziness, chest pain, palpitations, SOB.   Pt has not gotten out of bed.    Patient given LR boluses for decreased UOP with improvement in output.      O: VS: T(C): 36.8, Max: 37 (05-26 @ 23:45)  HR: 86 (65 - 109)  BP: 99/61 (83/47 - 120/67)  RR: 22 (17 - 27)  SpO2: 97% (95% - 100%)  Wt(kg): --  I/Os:I & Os for 24h ending 05-27 @ 07:00  =============================================  IN: 7000 ml / OUT: 1350 ml / NET: 5650 ml    I & Os for current day (as of 05-27 @ 12:02)  =============================================  IN: 875 ml / OUT: 470 ml / NET: 405 ml    Gen: NAD. AAOx3  CV: RRR  Pulm: CTAB  Abd: Softly distended, tender to deep palpation.  No rebound or guarding.  Hypoactive bowel sounds  Ext: NT bilaterally    FAST scan negative.        Labs:    Blood type: B Positive  Rubella IgG: RPR: Negative                          9.3<L>   11.06<H> >-----------< 138<L>    ( 05-27 @ 07:00 )             30.5<L>                        10.0<L>   12.98<H> >-----------< 127<L>    ( 05-27 @ 03:20 )             31.5<L>                        11.0<L>   8.45 >-----------< 152    ( 05-26 @ 17:00 )             35.2    05-27-17 @ 07:00      135  |  101  |  6<L>  ----------------------------<  121<H>  3.9   |  21<L>  |  0.53    05-27-17 @ 03:20      136  |  104  |  8   ----------------------------<  106<H>  3.9   |  19<L>  |  0.50        Ca    8.5      27 May 2017 07:00  Ca    8.4      27 May 2017 03:20

## 2017-05-27 NOTE — DISCHARGE NOTE OB - CARE PLAN
Principal Discharge DX:	 delivery delivered  Goal:	Normal postoperative course  Instructions for follow-up, activity and diet:	Nothing in vagina, no heavy lifting  Secondary Diagnosis:	H/O:   Secondary Diagnosis:	Insulin controlled gestational diabetes mellitus (GDM) during pregnancy, antepartum  Secondary Diagnosis:	Mild intermittent asthma without complication  Secondary Diagnosis:	Pelvic adhesions

## 2017-05-27 NOTE — DISCHARGE NOTE OB - MATERIALS PROVIDED
Breastfeeding Log/Tdap Vaccination (VIS Pub Date: 2012)/  Immunization Record/[facility] Hearing Screen Program/Shaken Baby Prevention Handout/Discharge Medication Information for Patients and Families Pocket Guide/MMR Vaccination (VIS Pub Date: 2012)/Vaccinations

## 2017-05-27 NOTE — DISCHARGE NOTE OB - HOSPITAL COURSE
Admitted for category 2 fetal tracing and contractions.  Delivered liveborn male infant.  Mother sand baby stable.

## 2017-05-27 NOTE — DISCHARGE NOTE OB - MEDICATION SUMMARY - MEDICATIONS TO TAKE
I will START or STAY ON the medications listed below when I get home from the hospital:    ibuprofen 600 mg oral tablet  -- 1 tab(s) by mouth every 6 hours  -- Indication: For Mild pain    acetaminophen-oxycodone 325 mg-5 mg oral tablet  -- 1 tab(s) by mouth every 6 hours, As Needed -for moderate pain MDD:4  -- Caution federal law prohibits the transfer of this drug to any person other  than the person for whom it was prescribed.  May cause drowsiness.  Alcohol may intensify this effect.  Use care when operating dangerous machinery.  This prescription cannot be refilled.  This product contains acetaminophen.  Do not use  with any other product containing acetaminophen to prevent possible liver damage.  Using more of this medication than prescribed may cause serious breathing problems.    -- Indication: For Moderate pain    Prenatal Multivitamins with Folic Acid 1 mg oral tablet  -- 1 tab(s) by mouth once a day  -- Indication: For breastfeeding

## 2017-05-28 DIAGNOSIS — J45.20 MILD INTERMITTENT ASTHMA, UNCOMPLICATED: ICD-10-CM

## 2017-05-28 DIAGNOSIS — D50.8 OTHER IRON DEFICIENCY ANEMIAS: ICD-10-CM

## 2017-05-28 DIAGNOSIS — N73.6 FEMALE PELVIC PERITONEAL ADHESIONS (POSTINFECTIVE): ICD-10-CM

## 2017-05-28 LAB
RUBV IGG SER-ACNC: 2.1 INDEX — SIGNIFICANT CHANGE UP
RUBV IGG SER-IMP: POSITIVE — SIGNIFICANT CHANGE UP

## 2017-05-28 RX ORDER — IBUPROFEN 200 MG
1 TABLET ORAL
Qty: 0 | Refills: 0 | COMMUNITY
Start: 2017-05-28

## 2017-05-28 RX ADMIN — Medication 325 MILLIGRAM(S): at 12:54

## 2017-05-28 RX ADMIN — SIMETHICONE 80 MILLIGRAM(S): 80 TABLET, CHEWABLE ORAL at 04:16

## 2017-05-28 RX ADMIN — Medication 100 MILLIGRAM(S): at 22:23

## 2017-05-28 RX ADMIN — Medication 600 MILLIGRAM(S): at 05:08

## 2017-05-28 RX ADMIN — Medication 975 MILLIGRAM(S): at 04:15

## 2017-05-28 RX ADMIN — Medication 100 MILLIGRAM(S): at 04:16

## 2017-05-28 RX ADMIN — Medication 100 MILLIGRAM(S): at 12:55

## 2017-05-28 RX ADMIN — Medication 600 MILLIGRAM(S): at 15:33

## 2017-05-28 RX ADMIN — Medication 975 MILLIGRAM(S): at 05:08

## 2017-05-28 RX ADMIN — Medication 600 MILLIGRAM(S): at 16:15

## 2017-05-28 RX ADMIN — Medication 1 TABLET(S): at 12:54

## 2017-05-28 RX ADMIN — SIMETHICONE 80 MILLIGRAM(S): 80 TABLET, CHEWABLE ORAL at 18:16

## 2017-05-28 RX ADMIN — Medication 600 MILLIGRAM(S): at 04:15

## 2017-05-28 RX ADMIN — HEPARIN SODIUM 5000 UNIT(S): 5000 INJECTION INTRAVENOUS; SUBCUTANEOUS at 22:24

## 2017-05-28 RX ADMIN — Medication 975 MILLIGRAM(S): at 15:33

## 2017-05-28 RX ADMIN — Medication 975 MILLIGRAM(S): at 16:15

## 2017-05-28 RX ADMIN — HEPARIN SODIUM 5000 UNIT(S): 5000 INJECTION INTRAVENOUS; SUBCUTANEOUS at 00:12

## 2017-05-28 NOTE — PROGRESS NOTE ADULT - SUBJECTIVE AND OBJECTIVE BOX
OB Postpartum Note: Repeat  Delivery, POD#2    S: 30yo  POD#2 s/p rLTCS. Her pain is well controlled. She is tolerating a regular diet and passing flatus. Voiding spontaneously. Denies N/V/D. Denies CP/SOB/lightheadedness/dizziness. She is breastfeeding.    O:   Vitals:  Vital Signs Last 24 Hrs  T(C): 36.7, Max: 37.2 (05-27 @ 10:00)  T(F): 98.1, Max: 98.9 (05-27 @ 10:00)  HR: 60 (60 - 95)  BP: 101/60 (83/47 - 101/60)  BP(mean): 69 (55 - 69)  RR: 16 (16 - 27)  SpO2: 99% (95% - 99%)    MEDICATIONS  (STANDING):  diphtheria/tetanus/pertussis (acellular) Vaccine (ADAcel) 0.5milliLiter(s) IntraMuscular once  ferrous    sulfate 325milliGRAM(s) Oral daily  prenatal multivitamin 1Tablet(s) Oral daily  heparin  Injectable 5000Unit(s) SubCutaneous every 12 hours  acetaminophen   Tablet. 975milliGRAM(s) Oral every 6 hours  ibuprofen  Tablet 600milliGRAM(s) Oral every 6 hours  lactated ringers. 1000milliLiter(s) IV Continuous <Continuous>  docusate sodium 100milliGRAM(s) Oral three times a day  insulin lispro (HumaLOG) corrective regimen sliding scale  SubCutaneous three times a day before meals  dextrose 5%. 1000milliLiter(s) IV Continuous <Continuous>  dextrose 50% Injectable 12.5Gram(s) IV Push once  dextrose 50% Injectable 25Gram(s) IV Push once  dextrose 50% Injectable 25Gram(s) IV Push once    MEDICATIONS  (PRN):  simethicone 80milliGRAM(s) Chew every 4 hours PRN Gas  diphenhydrAMINE   Capsule 25milliGRAM(s) Oral every 6 hours PRN Itching  glycerin Suppository - Adult 1Suppository(s) Rectal at bedtime PRN Constipation  lanolin Ointment 1Application(s) Topical every 3 hours PRN Sore Nipples  ALBUTerol    90 MICROgram(s) HFA Inhaler 2Puff(s) Inhalation every 6 hours PRN Bronchospasm  dextrose Gel 1Dose(s) Oral once PRN Blood Glucose LESS THAN 70 milliGRAM(s)/deciLiter  glucagon  Injectable 1milliGRAM(s) IntraMuscular once PRN Glucose <70 milliGRAM(s)/deciLiter      Labs:  Blood type: B Positive  Rubella IgG: RPR: Negative                          9.3<L>   11.06<H> >-----------< 138<L>    (  @ 07:00 )             30.5<L>                        10.0<L>   12.98<H> >-----------< 127<L>    (  @ 03:20 )             31.5<L>                        11.0<L>   8.45 >-----------< 152    (  @ 17:00 )             35.2    17 @ 07:00      135  |  101  |  6<L>  ----------------------------<  121<H>  3.9   |  21<L>  |  0.53    17 @ 03:20      136  |  104  |  8   ----------------------------<  106<H>  3.9   |  19<L>  |  0.50        Ca    8.5      27 May 2017 07:00  Ca    8.4      27 May 2017 03:20      PE:  General: NAD  Heart: RRR  Lungs: CTAB  Abdomen: mildly distended,appropriately tender, incision c/d/i.  Extremities: SCDs in place, no erythema    A/P: 30yo  POD#2 s/p rLTCS for h/o previous  delivery. EBL: 800.  - Continue regular diet.  - Increase ambulation.  - Continue motrin, tylenol, oxycodone PRN for pain control.    Leslee SHIPMAN PGY1

## 2017-05-29 VITALS
TEMPERATURE: 98 F | OXYGEN SATURATION: 98 % | DIASTOLIC BLOOD PRESSURE: 63 MMHG | RESPIRATION RATE: 18 BRPM | HEART RATE: 72 BPM | SYSTOLIC BLOOD PRESSURE: 102 MMHG

## 2017-05-29 DIAGNOSIS — O24.414 GESTATIONAL DIABETES MELLITUS IN PREGNANCY, INSULIN CONTROLLED: ICD-10-CM

## 2017-05-29 DIAGNOSIS — Z98.891 HISTORY OF UTERINE SCAR FROM PREVIOUS SURGERY: ICD-10-CM

## 2017-05-29 RX ORDER — OXYCODONE HYDROCHLORIDE 5 MG/1
1 TABLET ORAL
Qty: 20 | Refills: 0 | OUTPATIENT
Start: 2017-05-29

## 2017-05-29 RX ORDER — INSULIN LISPRO 100/ML
12 VIAL (ML) SUBCUTANEOUS
Qty: 0 | Refills: 0 | COMMUNITY

## 2017-05-29 RX ORDER — OXYCODONE HYDROCHLORIDE 5 MG/1
5 TABLET ORAL ONCE
Qty: 0 | Refills: 0 | Status: DISCONTINUED | OUTPATIENT
Start: 2017-05-29 | End: 2017-05-29

## 2017-05-29 RX ORDER — INSULIN DETEMIR 100/ML (3)
52 INSULIN PEN (ML) SUBCUTANEOUS
Qty: 0 | Refills: 0 | COMMUNITY

## 2017-05-29 RX ORDER — INSULIN LISPRO 100/ML
14 VIAL (ML) SUBCUTANEOUS
Qty: 0 | Refills: 0 | COMMUNITY

## 2017-05-29 RX ADMIN — Medication 1 TABLET(S): at 12:15

## 2017-05-29 RX ADMIN — Medication 325 MILLIGRAM(S): at 12:15

## 2017-05-29 RX ADMIN — HEPARIN SODIUM 5000 UNIT(S): 5000 INJECTION INTRAVENOUS; SUBCUTANEOUS at 11:00

## 2017-05-29 RX ADMIN — Medication 600 MILLIGRAM(S): at 00:11

## 2017-05-29 RX ADMIN — OXYCODONE HYDROCHLORIDE 5 MILLIGRAM(S): 5 TABLET ORAL at 13:00

## 2017-05-29 RX ADMIN — SIMETHICONE 80 MILLIGRAM(S): 80 TABLET, CHEWABLE ORAL at 00:11

## 2017-05-29 RX ADMIN — Medication 975 MILLIGRAM(S): at 00:11

## 2017-05-29 RX ADMIN — OXYCODONE HYDROCHLORIDE 5 MILLIGRAM(S): 5 TABLET ORAL at 13:40

## 2017-05-29 RX ADMIN — Medication 975 MILLIGRAM(S): at 01:00

## 2017-05-29 RX ADMIN — Medication 600 MILLIGRAM(S): at 01:00

## 2017-05-29 NOTE — PROGRESS NOTE ADULT - ASSESSMENT
- Continue with po analgesia  - Increase ambulation  - Continue regular diet  - IV lock  - No labs    Tarmerry, R2

## 2017-05-29 NOTE — PROGRESS NOTE ADULT - SUBJECTIVE AND OBJECTIVE BOX
Patient seen and examined at bedside, no acute overnight events. No acute complaints, pain well controlled.  Patient is ambulating, voiding spontaneously, passing flatus, and tolerating regular diet. Pt is breastfeeding her baby.    Vital Signs Last 24 Hours  T(C): 36.8, Max: 37 (05-28 @ 11:56)  HR: 72 (72 - 102)  BP: 102/63 (100/64 - 123/63)  RR: 18 (16 - 18)  SpO2: 98% (98% - 100%)    Physical Exam:  General: NAD  Abdomen: Soft, non-tender, non-distended, fundus firm  Incision: Pfannenstiel incision CDI, subcuticular suture closure  Pelvic: Lochia wnl    Labs:    Blood Type: B Positive  Rubella IgG: Positive (Positive=Immune, Negative=Non-Immune)  RPR: Negative               9.3    11.06 )-----------( 138      ( 05-27 @ 07:00 )             30.5                10.0   12.98 )-----------( 127      ( 05-27 @ 03:20 )             31.5                11.0   8.45  )-----------( 152      ( 05-26 @ 17:00 )             35.2         MEDICATIONS  (STANDING):  diphtheria/tetanus/pertussis (acellular) Vaccine (ADAcel) 0.5milliLiter(s) IntraMuscular once  ferrous    sulfate 325milliGRAM(s) Oral daily  prenatal multivitamin 1Tablet(s) Oral daily  heparin  Injectable 5000Unit(s) SubCutaneous every 12 hours  acetaminophen   Tablet. 975milliGRAM(s) Oral every 6 hours  ibuprofen  Tablet 600milliGRAM(s) Oral every 6 hours  lactated ringers. 1000milliLiter(s) IV Continuous <Continuous>  docusate sodium 100milliGRAM(s) Oral three times a day  insulin lispro (HumaLOG) corrective regimen sliding scale  SubCutaneous three times a day before meals  dextrose 5%. 1000milliLiter(s) IV Continuous <Continuous>  dextrose 50% Injectable 12.5Gram(s) IV Push once  dextrose 50% Injectable 25Gram(s) IV Push once  dextrose 50% Injectable 25Gram(s) IV Push once  bisacodyl Suppository 10milliGRAM(s) Rectal once    MEDICATIONS  (PRN):  simethicone 80milliGRAM(s) Chew every 4 hours PRN Gas  diphenhydrAMINE   Capsule 25milliGRAM(s) Oral every 6 hours PRN Itching  glycerin Suppository - Adult 1Suppository(s) Rectal at bedtime PRN Constipation  lanolin Ointment 1Application(s) Topical every 3 hours PRN Sore Nipples  ALBUTerol    90 MICROgram(s) HFA Inhaler 2Puff(s) Inhalation every 6 hours PRN Bronchospasm  dextrose Gel 1Dose(s) Oral once PRN Blood Glucose LESS THAN 70 milliGRAM(s)/deciLiter  glucagon  Injectable 1milliGRAM(s) IntraMuscular once PRN Glucose <70 milliGRAM(s)/deciLiter

## 2017-05-29 NOTE — PROGRESS NOTE ADULT - PROBLEM SELECTOR PROBLEM 2
Controlled type 2 diabetes mellitus without complication, without long-term current use of insulin
Insulin controlled gestational diabetes mellitus (GDM) during pregnancy, antepartum

## 2017-05-29 NOTE — PROGRESS NOTE ADULT - ATTENDING COMMENTS
Pt is pod#3 s/p repeat CS and NARINDER  Stable-doing well  For Discharge today with follow up in my office in 2 weeks
Agree with above .    Pt stable pod 1 s/p cs  Continue supportive care
Pt  is pod 2 s/p cs with abdominal distension-states not passing flatus.  Will order dulcolax suppository and encourage ambulation

## 2017-05-31 ENCOUNTER — EMERGENCY (EMERGENCY)
Facility: HOSPITAL | Age: 32
LOS: 1 days | Discharge: HOME | End: 2017-05-31
Attending: EMERGENCY MEDICINE | Admitting: EMERGENCY MEDICINE
Payer: COMMERCIAL

## 2017-05-31 VITALS
TEMPERATURE: 98 F | OXYGEN SATURATION: 100 % | SYSTOLIC BLOOD PRESSURE: 116 MMHG | HEART RATE: 67 BPM | RESPIRATION RATE: 16 BRPM | DIASTOLIC BLOOD PRESSURE: 67 MMHG

## 2017-05-31 DIAGNOSIS — O34.211 MATERNAL CARE FOR LOW TRANSVERSE SCAR FROM PREVIOUS CESAREAN DELIVERY: ICD-10-CM

## 2017-05-31 DIAGNOSIS — O26.899 OTHER SPECIFIED PREGNANCY RELATED CONDITIONS, UNSPECIFIED TRIMESTER: ICD-10-CM

## 2017-05-31 DIAGNOSIS — Z3A.00 WEEKS OF GESTATION OF PREGNANCY NOT SPECIFIED: ICD-10-CM

## 2017-05-31 PROCEDURE — 93010 ELECTROCARDIOGRAM REPORT: CPT

## 2017-05-31 PROCEDURE — 99285 EMERGENCY DEPT VISIT HI MDM: CPT | Mod: 25

## 2017-05-31 NOTE — ED ADULT TRIAGE NOTE - CHIEF COMPLAINT QUOTE
s/p C section on Friday. Discharged on 5/29. c/o weakness and swelling to b/l lower ext. Denies cp or sob. also c/o some back pain at site of epidural injection.

## 2017-06-01 VITALS
RESPIRATION RATE: 17 BRPM | SYSTOLIC BLOOD PRESSURE: 112 MMHG | HEART RATE: 53 BPM | DIASTOLIC BLOOD PRESSURE: 53 MMHG | TEMPERATURE: 98 F | OXYGEN SATURATION: 100 %

## 2017-06-01 LAB
ALBUMIN SERPL ELPH-MCNC: 2.8 G/DL — LOW (ref 3.3–5)
ALP SERPL-CCNC: 81 U/L — SIGNIFICANT CHANGE UP (ref 40–120)
ALT FLD-CCNC: 17 U/L — SIGNIFICANT CHANGE UP (ref 4–33)
APPEARANCE UR: CLEAR — SIGNIFICANT CHANGE UP
AST SERPL-CCNC: 24 U/L — SIGNIFICANT CHANGE UP (ref 4–32)
BASOPHILS # BLD AUTO: 0.02 K/UL — SIGNIFICANT CHANGE UP (ref 0–0.2)
BASOPHILS NFR BLD AUTO: 0.3 % — SIGNIFICANT CHANGE UP (ref 0–2)
BILIRUB SERPL-MCNC: < 0.2 MG/DL — LOW (ref 0.2–1.2)
BILIRUB UR-MCNC: NEGATIVE — SIGNIFICANT CHANGE UP
BLOOD UR QL VISUAL: HIGH
BUN SERPL-MCNC: 9 MG/DL — SIGNIFICANT CHANGE UP (ref 7–23)
CALCIUM SERPL-MCNC: 8.7 MG/DL — SIGNIFICANT CHANGE UP (ref 8.4–10.5)
CHLORIDE SERPL-SCNC: 106 MMOL/L — SIGNIFICANT CHANGE UP (ref 98–107)
CO2 SERPL-SCNC: 21 MMOL/L — LOW (ref 22–31)
COLOR SPEC: SIGNIFICANT CHANGE UP
CREAT SERPL-MCNC: 0.68 MG/DL — SIGNIFICANT CHANGE UP (ref 0.5–1.3)
EOSINOPHIL # BLD AUTO: 0.23 K/UL — SIGNIFICANT CHANGE UP (ref 0–0.5)
EOSINOPHIL NFR BLD AUTO: 3.8 % — SIGNIFICANT CHANGE UP (ref 0–6)
GLUCOSE SERPL-MCNC: 127 MG/DL — HIGH (ref 70–99)
GLUCOSE UR-MCNC: NEGATIVE — SIGNIFICANT CHANGE UP
HCT VFR BLD CALC: 27.7 % — LOW (ref 34.5–45)
HGB BLD-MCNC: 8.5 G/DL — LOW (ref 11.5–15.5)
IMM GRANULOCYTES NFR BLD AUTO: 0.3 % — SIGNIFICANT CHANGE UP (ref 0–1.5)
KETONES UR-MCNC: NEGATIVE — SIGNIFICANT CHANGE UP
LEUKOCYTE ESTERASE UR-ACNC: HIGH
LYMPHOCYTES # BLD AUTO: 1.54 K/UL — SIGNIFICANT CHANGE UP (ref 1–3.3)
LYMPHOCYTES # BLD AUTO: 25.1 % — SIGNIFICANT CHANGE UP (ref 13–44)
MCHC RBC-ENTMCNC: 24.1 PG — LOW (ref 27–34)
MCHC RBC-ENTMCNC: 30.7 % — LOW (ref 32–36)
MCV RBC AUTO: 78.7 FL — LOW (ref 80–100)
MONOCYTES # BLD AUTO: 0.47 K/UL — SIGNIFICANT CHANGE UP (ref 0–0.9)
MONOCYTES NFR BLD AUTO: 7.7 % — SIGNIFICANT CHANGE UP (ref 2–14)
MUCOUS THREADS # UR AUTO: SIGNIFICANT CHANGE UP
NEUTROPHILS # BLD AUTO: 3.85 K/UL — SIGNIFICANT CHANGE UP (ref 1.8–7.4)
NEUTROPHILS NFR BLD AUTO: 62.8 % — SIGNIFICANT CHANGE UP (ref 43–77)
NITRITE UR-MCNC: NEGATIVE — SIGNIFICANT CHANGE UP
NON-SQ EPI CELLS # UR AUTO: <1 — SIGNIFICANT CHANGE UP
PH UR: 6.5 — SIGNIFICANT CHANGE UP (ref 4.6–8)
PLATELET # BLD AUTO: 219 K/UL — SIGNIFICANT CHANGE UP (ref 150–400)
PMV BLD: 11.8 FL — SIGNIFICANT CHANGE UP (ref 7–13)
POTASSIUM SERPL-MCNC: 3.7 MMOL/L — SIGNIFICANT CHANGE UP (ref 3.5–5.3)
POTASSIUM SERPL-SCNC: 3.7 MMOL/L — SIGNIFICANT CHANGE UP (ref 3.5–5.3)
PROT SERPL-MCNC: 5.6 G/DL — LOW (ref 6–8.3)
PROT UR-MCNC: 10 — SIGNIFICANT CHANGE UP
RBC # BLD: 3.52 M/UL — LOW (ref 3.8–5.2)
RBC # FLD: 16.9 % — HIGH (ref 10.3–14.5)
RBC CASTS # UR COMP ASSIST: SIGNIFICANT CHANGE UP (ref 0–?)
SODIUM SERPL-SCNC: 142 MMOL/L — SIGNIFICANT CHANGE UP (ref 135–145)
SP GR SPEC: 1.01 — SIGNIFICANT CHANGE UP (ref 1–1.03)
SQUAMOUS # UR AUTO: SIGNIFICANT CHANGE UP
UROBILINOGEN FLD QL: NORMAL E.U. — SIGNIFICANT CHANGE UP (ref 0.1–0.2)
WBC # BLD: 6.13 K/UL — SIGNIFICANT CHANGE UP (ref 3.8–10.5)
WBC # FLD AUTO: 6.13 K/UL — SIGNIFICANT CHANGE UP (ref 3.8–10.5)
WBC UR QL: SIGNIFICANT CHANGE UP (ref 0–?)

## 2017-06-01 PROCEDURE — 93970 EXTREMITY STUDY: CPT | Mod: 26

## 2017-06-01 NOTE — ED PROVIDER NOTE - MUSCULOSKELETAL, MLM
Spine appears normal, range of motion is not limited, no muscle or joint tenderness. No calf tenderness. Nonpitting edema of legs and feet.

## 2017-06-01 NOTE — ED PROVIDER NOTE - MEDICAL DECISION MAKING DETAILS
Patient with no DVT on exam. Recommend keeping legs elevated, compression stocking. Follow up with outpatient PMD.

## 2017-06-01 NOTE — ED PROVIDER NOTE - PROGRESS NOTE DETAILS
pt with slightly decreased hgb from prior, no active bleeding, advised f/u with pmd in 1 week for recheck.

## 2017-06-01 NOTE — ED PROVIDER NOTE - ATTENDING CONTRIBUTION TO CARE
31yo female s/p  <1week ago pw generalized weakness for several days. In addition she notes increased leg swelling since Monday. She also notes that she felt like she was going to pass out 2 times. She didn't actually pass out. No presyncopal cp, sob, headache. No fevers or chills. She notes that she isn't ambulating very much since the . She also notes mild pain at the epidural site since she had the epidural. no numbness or tingling in feet, no weakness, no saddle anesthesia, no urinary retention/bowel incontinence, no ivdu, no fevers, no hx of cancer Exam:  well appearing, nad lungs: CTAB Heart: rrr no murmur Abd: soft, NTND Ext: b/l lower extremity edema, back c/d/i, no pain to percussion over spine  Plan: will r/o dvt, will check electrolytes, and cbc to r/o anemia. Likely mild fluid retention, no signs of post-partum cardiomyopathy, or preeclampsia, pt normotensive here. Pt encouraged to ambulate as much as possible and to f/u with her pmd/ob/gyn 33yo female s/p  <1week ago pw generalized weakness for several days. In addition she notes increased leg swelling since Monday. She also notes that she felt like she was going to pass out 2 times. She didn't actually pass out. No presyncopal cp, sob, headache. No fevers or chills. She notes that she isn't ambulating very much since the . She also notes mild pain at the epidural site since she had the epidural. no numbness or tingling in feet, no weakness, no saddle anesthesia, no urinary retention/bowel incontinence, no ivdu, no fevers, no hx of cancer Exam:  well appearing, nad lungs: CTAB Heart: rrr no murmur Abd: soft, NTND Ext: b/l lower extremity edema, back c/d/i, no pain to percussion over spine  Plan: will r/o dvt, will check electrolytes, and cbc to r/o anemia. Likely mild fluid retention, no signs of post-partum cardiomyopathy, or preeclampsia, pt normotensive here. Pt encouraged to ambulate as much as possible and to f/u with her pmd/ob/gyn ecg done at 2:23am, sinus 59, no acute st elevation or depression, no brugada, no wpw, no epsilon wave, no signs of HOCM, no prolonged qt

## 2017-06-01 NOTE — ED PROVIDER NOTE - OBJECTIVE STATEMENT
32F with recent  on  comes in with lower extremity swelling since returning home on . Patient says because of the swelling she also feels some vague pain in her knees and ankles when she walks but no calf pain. Has been ambulating a little at home but its limited due to her  pain. Also complains of vague symptoms, feels like she can feel herself breathing but does not have difficulty breathing in. No pain with deep breathing, no chest pain, no palpitation. Also no fevers, chills, n/v, visual changes, dysuria. No drainage from  site.

## 2017-06-01 NOTE — ED ADULT NURSE NOTE - OBJECTIVE STATEMENT
stevenson rn: pt arrives a*ox3 bib ems for weakness and pain at c section site, as well as BLE edema. pt had uncomplicated  on friday. was discharged home and has been feeling weak since. denies chest pain shortness of breath nausea or vomiting. states that vaginal bleeding has since dissipated. nad noted. resps even and unlabored. vs as stated. 20g iv access obtained. labs drawn and sent. primary rn in area aware of patient status.

## 2017-06-01 NOTE — ED PROVIDER NOTE - PLAN OF CARE
Please follow up with your PMD within 2 days of discharge. Keep your legs elevated to improve swelling. Repeat hemoglobin with your PMD in a week.

## 2017-06-01 NOTE — ED PROVIDER NOTE - CARE PLAN
Principal Discharge DX:	Leg swelling  Instructions for follow-up, activity and diet:	Please follow up with your PMD within 2 days of discharge. Keep your legs elevated to improve swelling. Repeat hemoglobin with your PMD in a week.

## 2020-05-06 NOTE — ED ADULT TRIAGE NOTE - PAIN RATING/NUMBER SCALE (0-10): REST
LMTCB attempt #2 of 3 to sched diabetes education via video call. Will call him a third and final time next week. 6

## 2020-07-09 NOTE — ED ADULT TRIAGE NOTE - AS O2 DELIVERY
Patient contacted in follow up of hospital stay for +COVID.        Care Transition Nurse/ Ambulatory Care Manager contacted the  patient by telephone to perform follow-up assessment. Verified name and  with patient as identifiers. Patient has following risk factors of: pneumonia, acute respiratory failure, immunocompromised and diabetes.       Symptoms reviewed with patient who verbalized the following symptoms: everything is so much better; Only noted some limited  shortness of breath during our call. No new symptoms;     Reports she is feeling so much better now, only very minimal SOB with walking/activity;  Hoarseness resolved along with sore throat;   Her FU appt remains on , tomorrow;  PCP, Dr. Junaid Otoole, in [place], [place]. Due to no new or worsening symptoms encounter was not routed to provider for escalation. Education reviewed with patient along with CDC guidelines; Patient verbalized understanding.   Pee Nunez  For more information on steps you can take to protect yourself, see CDC's How to [place] for follow-up call in 5-7 days based on severity of symptoms and risk factors, following her FU appointment with Dr. Mark Lee. room air

## 2020-08-18 NOTE — H&P PST ADULT - AS O2 DELIVERY
Called and spoke to pt.  Informed pt appt on 08/21 will be cancelled due to provider being out of the office.  Pt offered virtual visit and pt accepted pt.  Pt appreciated the call.   room air

## 2020-12-04 NOTE — PATIENT PROFILE OB - AS DELIV CSECT TYPE BABY A OB
Photo Preface (Leave Blank If You Do Not Want): Photographs were obtained today Detail Level: Detailed repeat

## 2024-09-12 ENCOUNTER — LABORATORY RESULT (OUTPATIENT)
Age: 39
End: 2024-09-12

## 2024-09-12 ENCOUNTER — APPOINTMENT (OUTPATIENT)
Dept: OBGYN | Facility: CLINIC | Age: 39
End: 2024-09-12
Payer: COMMERCIAL

## 2024-09-12 VITALS
DIASTOLIC BLOOD PRESSURE: 85 MMHG | SYSTOLIC BLOOD PRESSURE: 119 MMHG | WEIGHT: 168 LBS | HEIGHT: 62 IN | BODY MASS INDEX: 30.91 KG/M2

## 2024-09-12 DIAGNOSIS — Z01.419 ENCOUNTER FOR GYNECOLOGICAL EXAMINATION (GENERAL) (ROUTINE) W/OUT ABNORMAL FINDINGS: ICD-10-CM

## 2024-09-12 DIAGNOSIS — Z78.9 OTHER SPECIFIED HEALTH STATUS: ICD-10-CM

## 2024-09-12 DIAGNOSIS — N83.299 OTHER OVARIAN CYST, UNSPECIFIED SIDE: ICD-10-CM

## 2024-09-12 DIAGNOSIS — R10.2 PELVIC AND PERINEAL PAIN: ICD-10-CM

## 2024-09-12 DIAGNOSIS — Z80.42 FAMILY HISTORY OF MALIGNANT NEOPLASM OF PROSTATE: ICD-10-CM

## 2024-09-12 DIAGNOSIS — L76.82 OTHER POSTPROCEDURAL COMPLICATIONS OF SKIN AND SUBCUTANEOUS TISSUE: ICD-10-CM

## 2024-09-12 DIAGNOSIS — Z87.74 PERSONAL HISTORY OF (CORRECTED) CONGENITAL MALFORMATIONS OF HEART AND CIRCULATORY SYSTEM: ICD-10-CM

## 2024-09-12 DIAGNOSIS — Z83.3 FAMILY HISTORY OF DIABETES MELLITUS: ICD-10-CM

## 2024-09-12 DIAGNOSIS — N71.1 CHRONIC INFLAMMATORY DISEASE OF UTERUS: ICD-10-CM

## 2024-09-12 DIAGNOSIS — Z82.49 FAMILY HISTORY OF ISCHEMIC HEART DISEASE AND OTHER DISEASES OF THE CIRCULATORY SYSTEM: ICD-10-CM

## 2024-09-12 DIAGNOSIS — Z87.42 PERSONAL HISTORY OF OTHER DISEASES OF THE FEMALE GENITAL TRACT: ICD-10-CM

## 2024-09-12 DIAGNOSIS — Z86.2 PERSONAL HISTORY OF DISEASES OF THE BLOOD AND BLOOD-FORMING ORGANS AND CERTAIN DISORDERS INVOLVING THE IMMUNE MECHANISM: ICD-10-CM

## 2024-09-12 DIAGNOSIS — E11.9 TYPE 2 DIABETES MELLITUS W/OUT COMPLICATIONS: ICD-10-CM

## 2024-09-12 DIAGNOSIS — Z86.39 PERSONAL HISTORY OF OTHER ENDOCRINE, NUTRITIONAL AND METABOLIC DISEASE: ICD-10-CM

## 2024-09-12 PROCEDURE — 99385 PREV VISIT NEW AGE 18-39: CPT | Mod: 25

## 2024-09-12 PROCEDURE — 99203 OFFICE O/P NEW LOW 30 MIN: CPT

## 2024-09-12 RX ORDER — TIRZEPATIDE 7.5 MG/.5ML
INJECTION, SOLUTION SUBCUTANEOUS
Refills: 0 | Status: ACTIVE | COMMUNITY

## 2024-09-12 RX ORDER — DOXYCYCLINE HYCLATE 100 MG/1
100 TABLET ORAL
Qty: 28 | Refills: 0 | Status: ACTIVE | COMMUNITY
Start: 2024-09-12 | End: 1900-01-01

## 2024-09-12 RX ORDER — ROSUVASTATIN CALCIUM 5 MG/1
TABLET, FILM COATED ORAL
Refills: 0 | Status: ACTIVE | COMMUNITY

## 2024-09-13 NOTE — PHYSICAL EXAM
[Appropriately responsive] : appropriately responsive [Alert] : alert [No Acute Distress] : no acute distress [No Lymphadenopathy] : no lymphadenopathy [Regular Rate Rhythm] : regular rate rhythm [No Murmurs] : no murmurs [Clear to Auscultation B/L] : clear to auscultation bilaterally [Soft] : soft [Non-tender] : non-tender [Non-distended] : non-distended [No HSM] : No HSM [No Lesions] : no lesions [No Mass] : no mass [Oriented x3] : oriented x3 [Examination Of The Breasts] : a normal appearance [No Masses] : no breast masses were palpable [Labia Majora] : normal [Labia Minora] : normal [Scant] : There was scant vaginal bleeding [Normal] : normal [Tenderness] : tender [Uterine Adnexae] : normal [FreeTextEntry5] : cervix is very anterior and to the R.  [FreeTextEntry6] : uterus is tender to palp and is in the area where pt feels a lump and pain. It is likely adherent to the anterior abdominal wall.

## 2024-09-13 NOTE — HISTORY OF PRESENT ILLNESS
[Patient reported PAP Smear was normal] : Patient reported PAP Smear was normal [Patient reported colonoscopy was normal] : Patient reported colonoscopy was normal [FreeTextEntry1] : 38yo  LMP  who uses vasectomy for BCM is here for GYN intake and annual and to dsicuss pain under R side of s-cetion scar. Denies endometriosis. 2 months of intermenstrual spotting   [PapSmeardate] : 2022 [ColonoscopyDate] : 2016

## 2024-09-13 NOTE — DISCUSSION/SUMMARY
[FreeTextEntry1] : 38yo  LMP  who uses vasectomy for BCM with AUB, pelvic pain concerning for endometritis given h/o  h/o.    AUB and pain with ovulation likely 2/2 Chronic endo:  - TVUS and md Visit  - Doxy 100mg PO BID x 14 days  - Will need endo bx  - Will hold on  scar US as the "lump" is likely her uterus adherent to ant abdo wall.   T2DM: - Pt to see ophtho, pods, cards, PCP, endo  - Continue to follow diet and exercise and track Hgb A1c   RHM:  - DVS neg x 3 - Dentist, PCP, Derm as discussed  - Will give Rx for mammo/sono at next visit   - Offered STI screen today. Pt ok with testing off pap  - Encouraged healthy diet, exercise, weight  loss  Lina Valdez MD  Obstetrician/Gynecologist

## 2024-09-13 NOTE — DISCUSSION/SUMMARY
[FreeTextEntry1] : 40yo  LMP  who uses vasectomy for BCM with AUB, pelvic pain concerning for endometritis given h/o  h/o.    AUB and pain with ovulation likely 2/2 Chronic endo:  - TVUS and md Visit  - Doxy 100mg PO BID x 14 days  - Will need endo bx  - Will hold on  scar US as the "lump" is likely her uterus adherent to ant abdo wall.   T2DM: - Pt to see ophtho, pods, cards, PCP, endo  - Continue to follow diet and exercise and track Hgb A1c   RHM:  - DVS neg x 3 - Dentist, PCP, Derm as discussed  - Will give Rx for mammo/sono at next visit   - Offered STI screen today. Pt ok with testing off pap  - Encouraged healthy diet, exercise, weight  loss  Lina Valdez MD  Obstetrician/Gynecologist

## 2024-09-13 NOTE — HISTORY OF PRESENT ILLNESS
[Patient reported PAP Smear was normal] : Patient reported PAP Smear was normal [Patient reported colonoscopy was normal] : Patient reported colonoscopy was normal [FreeTextEntry1] : 40yo  LMP  who uses vasectomy for BCM is here for GYN intake and annual and to dsicuss pain under R side of s-cetion scar. Denies endometriosis. 2 months of intermenstrual spotting   [PapSmeardate] : 2022 [ColonoscopyDate] : 2016

## 2024-09-18 LAB
CYTOLOGY CVX/VAG DOC THIN PREP: NORMAL
HPV HIGH+LOW RISK DNA PNL CVX: NOT DETECTED